# Patient Record
Sex: FEMALE | Race: WHITE | Employment: FULL TIME | URBAN - METROPOLITAN AREA
[De-identification: names, ages, dates, MRNs, and addresses within clinical notes are randomized per-mention and may not be internally consistent; named-entity substitution may affect disease eponyms.]

---

## 2018-10-12 ENCOUNTER — OFFICE VISIT (OUTPATIENT)
Dept: NEUROLOGY | Age: 53
End: 2018-10-12

## 2018-10-12 VITALS
BODY MASS INDEX: 22.18 KG/M2 | OXYGEN SATURATION: 98 % | SYSTOLIC BLOOD PRESSURE: 104 MMHG | HEIGHT: 66 IN | HEART RATE: 64 BPM | WEIGHT: 138 LBS | DIASTOLIC BLOOD PRESSURE: 70 MMHG

## 2018-10-12 DIAGNOSIS — R51.9 PRESSURE IN HEAD: Primary | ICD-10-CM

## 2018-10-12 DIAGNOSIS — R53.83 FATIGUE, UNSPECIFIED TYPE: ICD-10-CM

## 2018-10-12 DIAGNOSIS — R53.1 WEAKNESS GENERALIZED: ICD-10-CM

## 2018-10-12 NOTE — MR AVS SNAPSHOT
[address] [place], [place] [address]  [address]  [address] [place] [address] [address] [phone] Patient: Aminata Phoenix MRN: [id number] :1965 Visit Information Date & Time Provider Department Dept. Phone Encounter #  
 10/12/2018  9:00 AM John Noriega MD [facility] Neurology [facility] [phone] [id number] Follow-up Instructions Return in about 5 weeks (around 2018). Your Appointments 10/24/2018  3:00 PM  
PROCEDURE with EMG SCHEDULE DRUG REHABILITATION  - [facility] ([facility]) Appt Note: complaints of generalized weakness; check 1 arm and 1 leg [facility]  [address]  [address] [place] [address] [address] [phone]  
  
   
 [facility]  [address] [address] [address] Upcoming Health Maintenance Date Due Hepatitis C Screening 1965 DTaP/Tdap/Td series (1 - Tdap) 1985 PAP AKA CERVICAL CYTOLOGY 1985 Shingrix Vaccine Age 50> (1 of 2) 2014 BREAST CANCER SCRN MAMMOGRAM 2014 FOBT Q 1 YEAR AGE 50-75 2014 Influenza Age 5 to Adult 2018 Allergies as of 10/12/2018  Review Complete On: 10/12/2018 By: Angle Loyd LPN No Known Allergies Current Immunizations  Never Reviewed No immunizations on file. Not reviewed this visit You Were Diagnosed With   
  
 Codes Comments Pressure in head    -  Primary ICD-10-CM: X87 ICD-9-CM: 492. 0 Fatigue, unspecified type     ICD-10-CM: R53.83 ICD-9-CM: 780.79 Weakness generalized     ICD-10-CM: R53.1 ICD-9-CM: 780.79 Vitals BP Pulse Height(growth percentile) Weight(growth percentile) SpO2 BMI  
 104/70 64 5' 6\" (1.676 m) 138 lb (62.6 kg) 98% 22.27 kg/m2 Smoking Status Never Smoker BMI and BSA Data Body Mass Index Body Surface Area  
 22.27 kg/m 2 1.71 m 2 Your Updated Medication List  
  
   
 This list is accurate as of 10/12/18  9:46 AM.  Always use your most recent med list.  
  
  
  
  
 CENTRUM SILVER ULTRA WOMEN'S PO Take  by mouth. We Performed the Following ACETYLCHOLINE RECEPTOR PANEL [id number] CPT(R)] CK [id number] CPT(R)] Follow-up Instructions Return in about 5 weeks (around 11/16/2018). To-Do List   
 10/12/2018 Neurology:  EMG LIMITED   
  
 10/12/2018 Imaging:  MRI BRAIN W WO CONT   
  
 10/12/2018 Lab:  VITAMIN D, 25 HYDROXY Introducing [facility]! [facility] introduces [employer] patient portal. Now you can access parts of your medical record, email your doctor's office, and request medication refills online. 1. In your internet browser, go to [email or web address] 2. Click on the First Time User? Click Here link in the Sign In box. You will see the New Member Sign Up page. 3. Enter your [employer] Access Code exactly as it appears below. You will not need to use this code after youve completed the sign-up process. If you do not sign up before the expiration date, you must request a new code. · [employer] Access Code: [email or web address] Expires: 1/10/2019  9:46 AM 
 
4. Enter the last four digits of your Social Security Number (xxxx) and Date of Birth (mm/dd/yyyy) as indicated and click Submit. You will be taken to the next sign-up page. 5. Create a Smallaat ID. This will be your [employer] login ID and cannot be changed, so think of one that is secure and easy to remember. 6. Create a [employer] password. You can change your password at any time. 7. Enter your Password Reset Question and Answer. This can be used at a later time if you forget your password. 8. Enter your e-mail address. You will receive e-mail notification when new information is available in [address] 9. Click Sign Up. You can now view and download portions of your medical record. 10. Click the Download Summary menu link to download a portable copy of your medical information. If you have questions, please visit the Frequently Asked Questions section of the [employer] website. Remember, [employer] is NOT to be used for urgent needs. For medical emergencies, dial 911. Now available from your iPhone and Android! Please provide this summary of care documentation to your next provider. Your primary care clinician is listed as Esa Rodriges. If you have any questions after today's visit, please call [phone].

## 2018-10-12 NOTE — PROGRESS NOTES
Name: Maggy Dodson      :  1965    PCP:   Shruti Rahman MD      Referring:  As above  MRN:   [id number]    Chief Complaint:   Chief Complaint   Patient presents with    New Patient     temporal head pressure     Extremity Weakness     arms and legs, sore, shake sometimes. HISTORY OF PRESENT ILLNESS:     This is a 46 y.o. female with hx of Fibromyalgia and Headaches who presents for evaluation of head pressure and extremity weakness. Describes that for the past 4 years when she's speaking, or if in a crowd and having to talk louder, or when signing in choir (in the past), she develops a moderate pressure in both temples. Not associated with nausea, vomiting. Has baseline light-sensitivity, but says also reports that her ears are more sensitive to sound. She says the head pressure has increased in frequency over the past 1 year, notices it during regular conversations now. Denies any FHx of cerebral aneurysm. Pt was diagnosed with Fibromyalgia in 2000, hasn't taken any prescription fibromyalgia meds. Says she'll only take Advil or Aleve if the pain is really bothering her. Reports over the past 1 year, arms feel weaker, or if she's holding something for long periods of time arms ache. Describes soreness/ aching pain in thighs more than lower legs. No associated rashes on face, chest or extremities. Gets severe cramping in feet mostly in evening when resting. Complete Review of Systems: + anxiety, possible fainting, fatigue, hearing loss, joint pain, memory loss, muscle pain, muscle weakness, tinnitus, skipped beats, snoring, vertigo, visual disturbance; otherwise as noted in HPI     No Known Allergies  Past Medical History:   Diagnosis Date    Fibromyalgia     Headache      Current Outpatient Prescriptions   Medication Sig Dispense Refill    multivit,iron,minerals/lutein (CENTRUM SILVER ULTRA WOMEN'S PO) Take  by mouth.        Past Surgical History:   Procedure Laterality Date    HX PARTIAL HYSTERECTOMY  2013     Family History   Problem Relation Age of Onset    Dementia Mother     Cancer Sister      Social History     Social History    Marital status:      Spouse name: N/A    Number of children: N/A    Years of education: N/A     Occupational History    Not on file. Social History Main Topics    Smoking status: Never Smoker    Smokeless tobacco: Never Used    Alcohol use 1.2 oz/week     2 Glasses of wine per week    Drug use: Not on file    Sexual activity: Not on file     Other Topics Concern    Not on file     Social History Narrative    No narrative on file       PHYSICAL EXAM  Vitals:    10/12/18 0904   BP: 104/70   Pulse: 64   SpO2: 98%   Weight: 62.6 kg (138 lb)   Height: 5' 6\" (1.676 m)       General:  Alert, cooperative, NAD   Head:  Normocephalic, atraumatic. Eyes:  Conjunctivae/corneas clear   Lungs:  Heart:  Non labored breathing  Regular rate, rhythm   Extremities: No edema. Skin: No rashes    Neurologic Exam       Language: normal  Memory:  Alert, oriented to person, place, situation    Cranial Nerves:  I: smell Not tested   II: visual fields Full to confrontation   II: pupils Equal, round, reactive to light   II: optic disc No papilledema   III,VII: ptosis none   III,IV,VI: extraocular muscles  normal   V: facial light touch sensation  normal   VII: facial muscle function  symmetric   VIII: hearing symmetric   IX: soft palate elevation  normal   XI: sternocleidomastoid strength 5/5   XII: tongue  midline      Motor: normal bulk, tone, strength in all exts  Sensory: intact to LT, PP, vibration x 4 exts   Cerebellar: no rest, postural, or intention tremor  Normal FNF and H-Shin bilaterally  Reflexes: 2+ throughout  Plantar response: neutral bilaterally    Gait: normal gait including tandem  Romberg negative     No outside clinic notes/ imaging reports available for review    ASSESSMENT AND PLAN    ICD-10-CM ICD-9-CM    1.  Pressure in head R51 784.0 MRI BRAIN W WO CONT   2. Fatigue, unspecified type R53.83 780.79 VITAMIN D, 25 HYDROXY      MRI BRAIN W WO CONT   3. Weakness generalized R53.1 780.79 CK      EMG LIMITED      MRI BRAIN W WO CONT      ACETYLCHOLINE RECEPTOR PANEL         1) Chronic, episodic, moderate, bi-temporal pressure when speaking for extended periods or having to talk loud. D/w pt does not sound like migraine. May be Tension headache. Will check MRI Brain +/- contrast to rule out underlying structural abnormalities, rule out Multiple scleross 2) Generalized weakness: no weakness or myalgia today on exam, which makes myopathy less likely. Will check CK, Ach-receptor antibodies, and EMG one upper and lower extremity to evaluate for that possibility. 3) Persistent fatigue: has been evaluated by PCP but pt doesn't think Vit D has been checked. Added that to labs. F/u after EMG, labs, MRI Brain completed. Thank you for allowing me to be a part of your patient's care. Please call me at [phone] if you have any questions.      Sincerely,  Inessa Duarte MD

## 2018-10-19 LAB
25(OH)D3+25(OH)D2 SERPL-MCNC: 31.5 NG/ML (ref 30–100)
ACHR BIND AB SER-SCNC: <0.03 NMOL/L (ref 0–0.24)
ACHR BLOCK AB SER-ACNC: 10 % (ref 0–25)
ACHR MOD AB/ACHR TOTAL SFR SER: <12 % (ref 0–20)
CK SERPL-CCNC: 67 U/L (ref 24–173)

## 2018-10-24 ENCOUNTER — OFFICE VISIT (OUTPATIENT)
Dept: NEUROLOGY | Age: 53
End: 2018-10-24

## 2018-10-24 DIAGNOSIS — R53.1 WEAKNESS GENERALIZED: Primary | ICD-10-CM

## 2018-10-24 DIAGNOSIS — M79.10 MUSCLE ACHE: ICD-10-CM

## 2018-11-02 ENCOUNTER — HOSPITAL ENCOUNTER (OUTPATIENT)
Dept: MRI IMAGING | Age: 53
Discharge: HOME | End: 2018-11-02
Attending: PSYCHIATRY & NEUROLOGY
Payer: COMMERCIAL

## 2018-11-02 DIAGNOSIS — R51.9 PRESSURE IN HEAD: ICD-10-CM

## 2018-11-02 DIAGNOSIS — R53.1 WEAKNESS GENERALIZED: ICD-10-CM

## 2018-11-02 DIAGNOSIS — R53.83 FATIGUE, UNSPECIFIED TYPE: ICD-10-CM

## 2018-11-02 PROCEDURE — A9575 INJ GADOTERATE MEGLUMI 0.1ML: HCPCS | Performed by: RADIOLOGY

## 2018-11-02 PROCEDURE — 70553 MRI BRAIN STEM W/O & W/DYE: CPT

## 2018-11-02 PROCEDURE — 74011250636 HC RX REV CODE- 250/636: Performed by: RADIOLOGY

## 2018-11-02 RX ORDER — GADOTERATE MEGLUMINE 376.9 MG/ML
10 INJECTION INTRAVENOUS
Status: COMPLETED | OUTPATIENT
Start: 2018-11-02 | End: 2018-11-02

## 2018-11-02 RX ADMIN — GADOTERATE MEGLUMINE 10 ML: 376.9 INJECTION INTRAVENOUS at 19:56

## 2018-11-14 ENCOUNTER — OFFICE VISIT (OUTPATIENT)
Dept: NEUROLOGY | Age: 53
End: 2018-11-14

## 2018-11-14 VITALS
SYSTOLIC BLOOD PRESSURE: 116 MMHG | WEIGHT: 128 LBS | HEART RATE: 71 BPM | DIASTOLIC BLOOD PRESSURE: 68 MMHG | BODY MASS INDEX: 20.57 KG/M2 | HEIGHT: 66 IN | OXYGEN SATURATION: 99 %

## 2018-11-14 DIAGNOSIS — M79.7 FIBROMYALGIA: ICD-10-CM

## 2018-11-14 DIAGNOSIS — R79.89 LOW VITAMIN D LEVEL: Primary | ICD-10-CM

## 2018-11-14 RX ORDER — DULOXETIN HYDROCHLORIDE 20 MG/1
20 CAPSULE, DELAYED RELEASE ORAL DAILY
Qty: 30 CAP | Refills: 0 | Status: ON HOLD | OUTPATIENT
Start: 2018-11-14 | End: 2020-12-04

## 2018-11-14 RX ORDER — ERGOCALCIFEROL 1.25 MG/1
50000 CAPSULE ORAL
Qty: 8 CAP | Refills: 0 | Status: SHIPPED | OUTPATIENT
Start: 2018-11-14 | End: 2019-01-09

## 2018-11-14 NOTE — PROGRESS NOTES
Interval HPI:   This is a 46 y.o. female who is following up for     Chief Complaint   Patient presents with    Results     Labs: CK normal, Ach-receptor antibodies normal, Vitamin D borderline low (30.1). MRI Brain (+/- contrast): normal.  EMG: Normal right upper and lower extremity electrodiagnostic study. No evidence of right CTS, ulnar neuropathy or cervical radiculopathy. No evidence of right lumbar radiculopathy or peripheral neuropathy. No evidence of myopathic process    Discussed above results, normal MRI Brain, normal EMG (no myopathy or peripheral neuropathy), vitam D level borderline low. Unchanged bi-temporal head pressure when talking loud or speaking for long time. Continues to have fibromyalgia symptoms but hasn't taken any medication for it. Brief ROS: as above or otherwise negative    ============  Brief Hx:  Taken from initial visit on 10-12-18:  1) Chronic, episodic, moderate, bi-temporal pressure when speaking for extended periods or having to talk loud. D/w pt does not sound like migraine. May be Tension headache. Will check MRI Brain +/- contrast to rule out underlying structural abnormalities, rule out Multiple scleross 2) Generalized weakness: no weakness or myalgia today on exam, which makes myopathy less likely. Will check CK, Ach-receptor antibodies, and EMG one upper and lower extremity to evaluate for that possibility. 3) Persistent fatigue: has been evaluated by PCP but pt doesn't think Vit D has been checked. Added that to labs. F/u after EMG, labs, MRI Brain completed. No Known Allergies  Current Outpatient Medications   Medication Sig Dispense Refill    DULoxetine (CYMBALTA) 20 mg capsule Take 1 Cap by mouth daily. 30 Cap 0    multivit,iron,minerals/lutein (CENTRUM SILVER ULTRA WOMEN'S PO) Take  by mouth. Physical Exam  Blood pressure 116/68, pulse 71, height 5' 6\" (1.676 m), weight 58.1 kg (128 lb), SpO2 99 %.     No acute distress  Neck: no stiffness  Skin: no rashes    Focused Neurological Exam     Mental status: Alert and oriented to person, place situation. Language: normal fluency and comprehension; no dysarthria. CNs:   Visual fields grossly normal  Extraocular movements intact, no nystagmus  Face appears symmetric and facial strength normal.    Hearing is intact to casual conversation. Sensory: intact light touch in all 4 extremities  Motor: Normal bulk and strength in all 4 extremities. Reflexes: not examined  Gait: not examined    Impression      ICD-10-CM ICD-9-CM    1. Low vitamin D level R79.89 790.6 ergocalciferol (ERGOCALCIFEROL) 50,000 unit capsule   2. Fibromyalgia M79.7 729.1 DULoxetine (CYMBALTA) 20 mg capsule     Discussed with pt/  that Vit D level is barely within the normal range and taking supplement may help her with her overall symptoms. Also discussed that taking a medication for her Fibromyalgia symptoms may improve her quality of life, though she's been able to push through without any medication for it. We agreed that I would give her the Rx for Cymbalta 20 mg (lowest dose) and she would consider filling it. No neurologic issues at this time so patient will continue care with PCP.

## 2018-11-14 NOTE — PROCEDURES
EMG/ NCS Report   [facility]  Blanka Arenas, [address] [place], [address]   Ph: [phone]   FAX: [phone]/ [phone]  Test Date:  10/24/2018    Patient: Naomi Saavedra : 1965 Physician: Jonathon Chen M.D. Sex: Female Height: ' \" Ref Phys: Jonathon Chen M.D.   ID#: [id number] Weight:  lbs. Technician: Leeann Matos     Patient History / Exam:  CC:? NEUROPATHY VS MYOPATHY    EMG & NCV Findings:  Evaluation of the right Fibular motor nerve showed normal distal onset latency (3.8 ms), normal amplitude (4.7 mV), normal conduction velocity (B Fib-Ankle, 53 m/s), and normal conduction velocity (Poplt-B Fib, 63 m/s). The right median motor nerve showed normal distal onset latency (2.9 ms), normal amplitude (11.4 mV), and normal conduction velocity (Elbow-Wrist, 59 m/s). The right tibial motor nerve showed normal distal onset latency (4.1 ms), normal amplitude (15.1 mV), and normal conduction velocity (Knee-Ankle, 46 m/s). The right ulnar motor nerve showed normal distal onset latency (2.5 ms), normal amplitude (7.2 mV), normal conduction velocity (B Elbow-Wrist, 65 m/s), and normal conduction velocity (A Elbow-B Elbow, 67 m/s). The right median sensory, the right radial sensory, the right sural sensory, and the right ulnar sensory nerves showed normal distal peak latency (R3.0, R1.8, R3.1, R3.2 ms) and normal amplitude (R57.5, R73.9, R15.7, R48.2 µV). The right Sup Fibular sensory nerve showed normal distal peak latency (2.6 ms), normal amplitude (11.3 µV), and normal conduction velocity (Lower leg-Lat ankle, 50 m/s). All F Wave latencies were within normal limits. Needle evaluation of the right extensor digitorum brevis muscle showed increased motor unit amplitude and moderately increased polyphasic potentials. All remaining muscles (as indicated in the following table) showed no evidence of electrical instability. Impression:    Normal right upper and lower extremity electrodiagnostic study  No evidence of right CTS, ulnar neuropathy or cervical radiculopathy  No evidence of right lumbar radiculopathy or peripheral neuropathy  No evidence of myopathic process    ___________________________  Morris Padgett M.D.      Nerve Conduction Studies  Anti Sensory Summary Table     Stim Site NR Peak (ms) Norm Peak (ms) P-T Amp (µV) Norm P-T Amp Site1 Site2 Dist (cm)   Right Median Anti Sensory (2nd Digit)  32.3°C   Wrist    3.0 <4 57.5 >13 Wrist 2nd Digit 14.0   Right Radial Anti Sensory (Base 1st Digit)  31.8°C   Wrist    1.8 <2.8 73.9 >11 Wrist Base 1st Digit 10.0   Right Sup Fibular Anti Sensory (Lat ankle)  32.3°C   Lower leg    2.6 <4.5 11.3 >5 Lower leg Lat ankle 10.0   Site 2    2.6  16.0       Right Sural Anti Sensory (Lat Mall)  32.7°C   Calf    3.1 <4.5 15.7 >4.0 Calf Lat Mall 14.0   Site 2    3.0  16.0       Right Ulnar Anti Sensory (5th Digit)  32.2°C   Wrist    3.2 <4.0 48.2 >9 Wrist 5th Digit 14.0     Motor Summary Table     Stim Site NR Onset (ms) Norm Onset (ms) O-P Amp (mV) Norm O-P Amp Amp (Prev) (%) Site1 Site2 Dist (cm) Melvin (m/s) Norm Melvin (m/s)   Right Fibular Motor (Ext Dig Brev)  32.1°C   Ankle    3.8 <6.5 4.7 >2.6 100.0 Ankle Ext Dig Brev 8.0     B Fib    9.6  4.2  89.4 B Fib Ankle 31.0 53 >38   Poplt    11.2  4.4  104.8 Poplt B Fib 10.0 63 >42   Right Median Motor (Abd Poll Brev)  31.6°C   Wrist    2.9 <4.5 11.4 >4.1 100.0 Wrist Abd Poll Brev 8.0     Elbow    6.3  11.8  103.5 Elbow Wrist 20.0 59 >49   Right Tibial Motor (Abd Chadwick Brev)  31.9°C   Ankle    4.1 <6.1 15.1 >5.3 100.0 Ankle Abd Chadwick Brev 8.0     Knee    12.2  12.9  85.4 Knee Ankle 37.0 46 >39   Right Ulnar Motor (Abd Dig Minimi)  30.6°C   Wrist    2.5 <3.1 7.2 >7.0 100.0 Wrist Abd Dig Minimi 8.0  >50   B Elbow    5.5  7.1  98.6 B Elbow Wrist 19.5 65 >50   A Elbow    7.0  7.1  100.0 A Elbow B Elbow 10.0 67 >50     F Wave Studies     NR F-Lat (ms) Lat Norm (ms) L-R F-Lat (ms) L-R Lat Norm   Right Tibial (Mrkrs) (Abd Hallucis)  31.8°C      49.48 <56  <5.7   Right Ulnar (Mrkrs) (Abd Dig Min)  30.5°C      24.95 <32  <2.5     H Reflex Studies     NR H-Lat (ms) L-R H-Lat (ms) L-R Lat Norm   Right Tibial (Gastroc)  31.7°C      28.00  <2.0     EMG     Side Muscle Nerve Root Ins Act Fibs Psw Recrt Duration Amp Poly Comment   Right Ext Dig Brev Dp Br Peron L5, S1 Nml Nml Nml Nml Nml Incr 2+    Right MedGastroc Tibial S1-2 Nml Nml Nml Nml Nml Nml Nml    Right AntTibialis Dp Br Peron L4-5 Nml Nml Nml Nml Nml Nml Nml    Right VastusMed Femoral L2-4 Nml Nml Nml Nml Nml Nml Nml    Right GluteusMax InfGluteal L5-S2 Nml Nml Nml Nml Nml Nml Nml    Right Lower Lumb Parasp Rami L5,S1 Nml Nml Nml Nml Nml Nml Nml    Right 1stDorInt Ulnar C8-T1 Nml Nml Nml Nml Nml Nml Nml    Right PronatorTeres Median C6-7 Nml Nml Nml Nml Nml Nml Nml    Right Biceps Musculocut C5-6 Nml Nml Nml Nml Nml Nml Nml    Right Triceps Radial C6-7-8 Nml Nml Nml Nml Nml Nml Nml    Right Deltoid Axillary C5-6 Nml Nml Nml Nml Nml Nml Nml    Right Lower Cerv Parasp Rami C7,T1 Nml Nml Nml Nml Nml Nml Nml          Waveforms:

## 2018-11-14 NOTE — PATIENT INSTRUCTIONS
[facility]   Statement to Patients  April 1, 2014      In an effort to ensure the large volume of patient prescription refills is processed in the most efficient and expeditious manner, we are asking our patients to assist us by calling your Pharmacy for all prescription refills, this will include also your  Mail Order Pharmacy. The pharmacy will contact our office electronically to continue the refill process. Please do not wait until the last minute to call your pharmacy. We need at least 48 hours (2days) to fill prescriptions. We also encourage you to call your pharmacy before going to  your prescription to make sure it is ready. With regard to controlled substance prescription refill requests (narcotic refills) that need to be picked up at our office, we ask your cooperation by providing us with at least 72 hours (3days) notice that you will need a refill. We will not refill narcotic prescription refill requests after 4:00pm on any weekday, Monday through Thursday, or after 2:00pm on Fridays, or on the weekends. We encourage everyone to explore another way of getting your prescription refill request processed using [employer], our patient web portal through our electronic medical record system. [employer] is an efficient and effective way to communicate your medication request directly to the office and  downloadable as an claire on your smart phone . [employer] also features a review functionality that allows you to view your medication list as well as leave messages for your physician. Are you ready to get connected? If so please review the attatched instructions or speak to any of our staff to get you set up right away! Thank you so much for your cooperation. Should you have any questions please contact our Practice Administrator.     The Physicians and Staff,  [facility]

## 2018-11-14 NOTE — PROGRESS NOTES
Patient is here with her  for results. States Dr. Qiuntana Shinto was supposed to prescribe vitamin D but never did.

## 2020-11-30 ENCOUNTER — TRANSCRIBE ORDER (OUTPATIENT)
Dept: REGISTRATION | Age: 55
End: 2020-11-30

## 2020-11-30 ENCOUNTER — HOSPITAL ENCOUNTER (OUTPATIENT)
Dept: LAB | Age: 55
Discharge: HOME | End: 2020-11-30
Payer: COMMERCIAL

## 2020-11-30 DIAGNOSIS — Z01.812 PRE-PROCEDURAL LABORATORY EXAMINATIONS: Primary | ICD-10-CM

## 2020-11-30 DIAGNOSIS — Z01.812 PRE-PROCEDURAL LABORATORY EXAMINATIONS: ICD-10-CM

## 2020-11-30 PROCEDURE — 87635 SARS-COV-2 COVID-19 AMP PRB: CPT

## 2020-12-01 LAB — SARS-COV-2, COV2NT: NOT DETECTED

## 2020-12-04 ENCOUNTER — HOSPITAL ENCOUNTER (OUTPATIENT)
Age: 55
Setting detail: OUTPATIENT SURGERY
Discharge: HOME | End: 2020-12-04
Attending: SPECIALIST | Admitting: SPECIALIST
Payer: COMMERCIAL

## 2020-12-04 VITALS
RESPIRATION RATE: 20 BRPM | HEART RATE: 80 BPM | DIASTOLIC BLOOD PRESSURE: 61 MMHG | SYSTOLIC BLOOD PRESSURE: 115 MMHG | HEIGHT: 66 IN | WEIGHT: 146.7 LBS | OXYGEN SATURATION: 100 % | TEMPERATURE: 98.3 F | BODY MASS INDEX: 23.58 KG/M2

## 2020-12-04 DIAGNOSIS — R07.9 CHEST PAIN, UNSPECIFIED TYPE: ICD-10-CM

## 2020-12-04 LAB
ANION GAP SERPL CALC-SCNC: 6 MMOL/L (ref 5–15)
BUN SERPL-MCNC: 14 MG/DL (ref 6–20)
BUN/CREAT SERPL: 16 (ref 12–20)
CALCIUM SERPL-MCNC: 9.5 MG/DL (ref 8.5–10.1)
CHLORIDE SERPL-SCNC: 105 MMOL/L (ref 97–108)
CO2 SERPL-SCNC: 30 MMOL/L (ref 21–32)
CREAT SERPL-MCNC: 0.9 MG/DL (ref 0.55–1.02)
ERYTHROCYTE [DISTWIDTH] IN BLOOD BY AUTOMATED COUNT: 12.7 % (ref 11.5–14.5)
GLUCOSE SERPL-MCNC: 92 MG/DL (ref 65–100)
HCT VFR BLD AUTO: 44.5 % (ref 35–47)
HGB BLD-MCNC: 14.9 G/DL (ref 11.5–16)
MAGNESIUM SERPL-MCNC: 2.1 MG/DL (ref 1.6–2.4)
MCH RBC QN AUTO: 29.7 PG (ref 26–34)
MCHC RBC AUTO-ENTMCNC: 33.5 G/DL (ref 30–36.5)
MCV RBC AUTO: 88.8 FL (ref 80–99)
NRBC # BLD: 0 K/UL (ref 0–0.01)
NRBC BLD-RTO: 0 PER 100 WBC
PLATELET # BLD AUTO: 203 K/UL (ref 150–400)
PMV BLD AUTO: 9.6 FL (ref 8.9–12.9)
POTASSIUM SERPL-SCNC: 3.5 MMOL/L (ref 3.5–5.1)
RBC # BLD AUTO: 5.01 M/UL (ref 3.8–5.2)
SODIUM SERPL-SCNC: 141 MMOL/L (ref 136–145)
WBC # BLD AUTO: 5.4 K/UL (ref 3.6–11)

## 2020-12-04 PROCEDURE — 74011000250 HC RX REV CODE- 250: Performed by: SPECIALIST

## 2020-12-04 PROCEDURE — 77030008543 HC TBNG MON PRSS MRTM -A: Performed by: SPECIALIST

## 2020-12-04 PROCEDURE — C1894 INTRO/SHEATH, NON-LASER: HCPCS | Performed by: SPECIALIST

## 2020-12-04 PROCEDURE — 80048 BASIC METABOLIC PNL TOTAL CA: CPT

## 2020-12-04 PROCEDURE — 36415 COLL VENOUS BLD VENIPUNCTURE: CPT

## 2020-12-04 PROCEDURE — 77030029065 HC DRSG HEMO QCLOT ZMED -B

## 2020-12-04 PROCEDURE — 93458 L HRT ARTERY/VENTRICLE ANGIO: CPT | Performed by: SPECIALIST

## 2020-12-04 PROCEDURE — 83735 ASSAY OF MAGNESIUM: CPT

## 2020-12-04 PROCEDURE — 74011000636 HC RX REV CODE- 636: Performed by: SPECIALIST

## 2020-12-04 PROCEDURE — 74011250636 HC RX REV CODE- 250/636: Performed by: SPECIALIST

## 2020-12-04 PROCEDURE — 77030019569 HC BND COMPR RAD TERU -B: Performed by: SPECIALIST

## 2020-12-04 PROCEDURE — 77030004532 HC CATH ANGI DX IMP BSC -A: Performed by: SPECIALIST

## 2020-12-04 PROCEDURE — 99152 MOD SED SAME PHYS/QHP 5/>YRS: CPT | Performed by: SPECIALIST

## 2020-12-04 PROCEDURE — 85027 COMPLETE CBC AUTOMATED: CPT

## 2020-12-04 PROCEDURE — C1769 GUIDE WIRE: HCPCS | Performed by: SPECIALIST

## 2020-12-04 PROCEDURE — 99153 MOD SED SAME PHYS/QHP EA: CPT | Performed by: SPECIALIST

## 2020-12-04 RX ORDER — HEPARIN SODIUM 200 [USP'U]/100ML
INJECTION, SOLUTION INTRAVENOUS
Status: COMPLETED | OUTPATIENT
Start: 2020-12-04 | End: 2020-12-04

## 2020-12-04 RX ORDER — MIDAZOLAM HYDROCHLORIDE 1 MG/ML
INJECTION, SOLUTION INTRAMUSCULAR; INTRAVENOUS AS NEEDED
Status: DISCONTINUED | OUTPATIENT
Start: 2020-12-04 | End: 2020-12-04 | Stop reason: HOSPADM

## 2020-12-04 RX ORDER — FENTANYL CITRATE 50 UG/ML
INJECTION, SOLUTION INTRAMUSCULAR; INTRAVENOUS AS NEEDED
Status: DISCONTINUED | OUTPATIENT
Start: 2020-12-04 | End: 2020-12-04 | Stop reason: HOSPADM

## 2020-12-04 RX ORDER — MELOXICAM 15 MG/1
15 TABLET ORAL DAILY
COMMUNITY

## 2020-12-04 RX ORDER — SODIUM CHLORIDE 0.9 % (FLUSH) 0.9 %
5-40 SYRINGE (ML) INJECTION AS NEEDED
Status: DISCONTINUED | OUTPATIENT
Start: 2020-12-04 | End: 2020-12-04 | Stop reason: HOSPADM

## 2020-12-04 RX ORDER — DIPHENHYDRAMINE HYDROCHLORIDE 50 MG/ML
25 INJECTION, SOLUTION INTRAMUSCULAR; INTRAVENOUS
Status: DISCONTINUED | OUTPATIENT
Start: 2020-12-04 | End: 2020-12-04 | Stop reason: HOSPADM

## 2020-12-04 RX ORDER — SODIUM CHLORIDE 0.9 % (FLUSH) 0.9 %
5-40 SYRINGE (ML) INJECTION EVERY 8 HOURS
Status: DISCONTINUED | OUTPATIENT
Start: 2020-12-04 | End: 2020-12-04 | Stop reason: HOSPADM

## 2020-12-04 RX ORDER — SODIUM CHLORIDE 9 MG/ML
125 INJECTION, SOLUTION INTRAVENOUS CONTINUOUS
Status: DISCONTINUED | OUTPATIENT
Start: 2020-12-04 | End: 2020-12-04 | Stop reason: HOSPADM

## 2020-12-04 RX ORDER — SODIUM CHLORIDE 9 MG/ML
75 INJECTION, SOLUTION INTRAVENOUS CONTINUOUS
Status: DISCONTINUED | OUTPATIENT
Start: 2020-12-04 | End: 2020-12-04 | Stop reason: HOSPADM

## 2020-12-04 RX ORDER — LIDOCAINE HYDROCHLORIDE 10 MG/ML
INJECTION INFILTRATION; PERINEURAL AS NEEDED
Status: DISCONTINUED | OUTPATIENT
Start: 2020-12-04 | End: 2020-12-04 | Stop reason: HOSPADM

## 2020-12-04 RX ORDER — VERAPAMIL HYDROCHLORIDE 2.5 MG/ML
INJECTION, SOLUTION INTRAVENOUS AS NEEDED
Status: DISCONTINUED | OUTPATIENT
Start: 2020-12-04 | End: 2020-12-04 | Stop reason: HOSPADM

## 2020-12-04 RX ORDER — HYDROCORTISONE SODIUM SUCCINATE 100 MG/2ML
100 INJECTION, POWDER, FOR SOLUTION INTRAMUSCULAR; INTRAVENOUS
Status: DISCONTINUED | OUTPATIENT
Start: 2020-12-04 | End: 2020-12-04 | Stop reason: HOSPADM

## 2020-12-04 RX ORDER — HEPARIN SODIUM 1000 [USP'U]/ML
INJECTION, SOLUTION INTRAVENOUS; SUBCUTANEOUS AS NEEDED
Status: DISCONTINUED | OUTPATIENT
Start: 2020-12-04 | End: 2020-12-04 | Stop reason: HOSPADM

## 2020-12-04 RX ADMIN — SODIUM CHLORIDE 125 ML/HR: 900 INJECTION, SOLUTION INTRAVENOUS at 11:17

## 2020-12-04 RX ADMIN — SODIUM CHLORIDE 75 ML/HR: 900 INJECTION, SOLUTION INTRAVENOUS at 09:17

## 2020-12-04 RX ADMIN — SODIUM CHLORIDE 250 ML: 900 INJECTION, SOLUTION INTRAVENOUS at 13:55

## 2020-12-04 RX ADMIN — SODIUM CHLORIDE 250 ML: 900 INJECTION, SOLUTION INTRAVENOUS at 13:25

## 2020-12-04 NOTE — PROCEDURES
Cath:  Normal cors. Normal LVF (EF 65%). No AVG/MR.   Right radial TR band    F/U with Dr. Good Pole 12/15/20 @ 2pm for palpitation eval.

## 2020-12-04 NOTE — Clinical Note
TRANSFER - OUT REPORT:     Verbal report given to: Sarthak RAZA. Report consisted of patient's Situation, Background, Assessment and   Recommendations(SBAR). Opportunity for questions and clarification was provided. Patient transported with a Registered Nurse and [address] [place] [address] / [facility]. [place] [facility]

## 2020-12-04 NOTE — PROGRESS NOTES
TRANSFER - IN REPORT:    Verbal report received from [facility]) on Hunter Kayser  being received from cath lab(unit) for routine progression of care      Report consisted of patients Situation, Background, Assessment and   Recommendations(SBAR). Information from the following report(s) Procedure Summary was reviewed with the receiving nurse. Opportunity for questions and clarification was provided. Assessment completed upon patients arrival to unit and care assumed. 1100: Patient received from the cath lab. Patient with TR band to the right radial. Clean, dry and intact. Splint in place. Pulses palpable. VS stable. No complaints at this time. 1140: Discharge instructions and prescriptions reviewed with patient  Maverick Doris. Opportunity provided for questions. Maverick Doris verbalized understanding. 1150: Patient with complaint of numbess to right thumb and 2 fingers. Pulses palpable. Hand warm and appropriate color. Notified Dr. Dorothy Lamar. No new orders at this time. Will continue to monitor    1200: 2 ml air released from TR Band. Bleeding noted. Radial and Ulnar pulse on right wrist palpable. Pt oozed. 2mL of air reinfused. Will continue to monitor. 1252: 2 ml air released from TR Band. No bleeding or hematoma noted. Radial and Ulnar pulse on right wrist palpable. Pt tolerated well. Will continue to monitor. 1258: 2 ml air released from TR Band. No bleeding or hematoma noted. Radial and Ulnar pulse on right wrist palpable. Pt tolerated well. Will continue to monitor. 1303: 2 ml air released from TR Band. No bleeding or hematoma noted. Radial and Ulnar pulse on right wrist palpable. Pt tolerated well. Will continue to monitor. 1309: 2 ml air released from TR Band. No bleeding or hematoma noted. Radial and Ulnar pulse on right wrist palpable. Pt tolerated well. Will continue to monitor. 1315: 2 ml air released from TR Band. No bleeding or hematoma noted.  Radial and Ulnar pulse on right wrist palpable. Pt tolerated well. Will continue to monitor. 1320: Air release completed. TR Band removed from right wrist. No bleeding or  Hematoma. Dressing applied. Wrist immobilizer in place. Radial and ulnar pulse remain palpable on affected extremity. Pt tolerated well. Instructions given to pt regarding movement and activity restrictions. Pt voiced understanding. Patient reports no numbness to fingers now that band is discontinued. 1322: Patient blood pressure 79/43. Patient placed in reverse [place] Feeling lightheaded and nausaous. Notified Dr. Elvie Rodriguez. Orders to give 250mL bolus now and another as needed. Will continue to monitor. 1350: Pressures improved with bolus. Patient asymtomatic. Last pressure 88/71. Patient given 2nd bolus per Dr. Elvie Rodriguez. Patient eating lunch at this time. 1430: Patient blood pressures improved. Will continue to monitor. No complaints of dizziness or lightheadness. Discharge instructions and prescriptions reviewed with patient. Opportunity provided for questions. Patient verbalized understanding. Signed copy of discharge placed in the front of patient's chart. 1505: Patient dangled on the side of the bed. Patient with quickclot to right radial. Clean, dry and intact. No hematoma. Pulses palpable. Patient reports improvement to numbess. Sensation present. VS stable. No complaints at this time. 1510: Patient ambulated to the bathroom. Voided. Patient with quickclot to right radial. Clean, dry and intact. No hematoma. Pulses palpable. Sensation present. VS stable. No complaints at this time. 1520: IV and tele removed. Patient assisted with dressing    [phone]: Patient escorted via wheelchair to entrance.  Lorenzo Taylor driving. Patient discharged into care of Lorenzo Taylor.

## 2020-12-04 NOTE — Clinical Note
TRANSFER - IN REPORT:     Verbal report received from: Vazquez Jeong. Report consisted of patient's Situation, Background, Assessment and   Recommendations(SBAR). Opportunity for questions and clarification was provided. Assessment completed upon patient's arrival to unit and care assumed. Patient transported with a Registered Nurse.

## 2020-12-04 NOTE — H&P
Date of Surgery Update:  Deonte Shaw was seen and examined. History and physical has been reviewed. The patient has been examined. There have been no significant clinical changes since the completion of the originally dated History and Physical.    Signed By: Josefina Neumann MD     December 4, 2020 9:30 AM         +ETT        Pratibha Hooper 1965   Office/Outpatient Visit  Visit Date: Wed, Nov 25, 2020 11:30 am  Provider: Hamlet Pradhan MD (Assistant: Nash Cortez LPN )  Location: [facility])[address] [place]. [address] [phone]    Electronically signed by Rand Dancer, MD on  11/25/2020 12:25:24 PM                           Subjective:    CC: Ms. Jodi Melendrez is a 63year old White female. Her primary care physician is Shyla Beltran. She is here to follow up with the doctor regarding previous testing, treadmill stress test - 11/19/20. She presents today with a complaint of chest pain and palpitations. Patient verbalized medications unchanged since last office visit. The primary reason for today's visit is evaluation of the following: abnormal graded exercise treadmill stress test on 11/19/20. She has a history of unspecified hyperlipidemia and hypertension. **No EKG today, sxs have gotten better. **    HPI:           Hypercholesterolemia: Current treatment includes diet. Regarding hypertension:  Type Primary Hypertension           Regarding chest pain:  MD Notes: she needs a cath, she is thinking about it She characterizes the pain as pressure. Cardiac risk factors include a family history of coronary artery disease. Started when had partial hysterectomy. She has been feeling pressure. Asking if with activity - not with playing golf or exercising, she got the pain last night while sitting on the couch while watching TV. When she walks up hill playing golf, she will feel pain if she does prolonged walking. No chest pain on treadmill.  EKG did not tell enough information. Her mother had stent placement in her [de-identified]. She has siblings, with diabetes. Her son is type 1 diabetic. In last few weeks, the palpitations and pain was more frequent. She has trouble telling if it is acid reflux. She had colonoscopy, upper and lower. She wants to know if menopause is causing her symptoms - not necessarily. She walks and rides on cart around golf cart. Has to slow down and stop, sometimes she feels pinch in her chest. Does not stay long. Makes her aware she needs to stop. Telling her the EKG with stress test showed possibility of blockage. She went walking a few weeks ago, was feeling the same symptoms. She was raking mulch the other day, she noticed her whole body was trying to tell her to stop. Swelling in hands, telling her this could be menopausal.          Paplitations/Arrhythmias:  MD Notes: chest pain atypical but sometimes while golfing, palpitations flare not so much in the last week, need blood work ett  , Regarding other cardiac arrhythmias: pt presents with palpitations No palpitations in the last few weeks. (Mild)   Abnormal treadmill stress test (TMST) noted. ROS:   Discussed relevant lab and imaging studies along with the plan of treatment with the nurse. EYES:  Negative for blurred vision and eye pain. E/N/T:  Negative for epistaxis and hoarseness. CARDIOVASCULAR:  Please see HPI. RESPIRATORY:  Negative for cough and hemoptysis. GASTROINTESTINAL:  Negative for abdominal pain and dysphagia. MUSCULOSKELETAL:  Negative for arthralgias and back pain. NEUROLOGICAL:  Negative for headaches, paresthesias, and weakness. HEMATOLOGIC/LYMPHATIC:  Negative for easy bruising, bleeding, and lymphadenopathy. PSYCHIATRIC:  No symptoms reported.       Past Medical History / Family History / Social History:     Last Reviewed on 11/25/2020 11:51 AM by Rosana Chew LPN  Past Medical History:     Hyperlipidemia  Hypertension   Osteoarthritis INFLUENZA VACCINE: was last done      Past Cardiac Procedures:  Treadmill Stress Test:  2020 - 9 mins. There was ECG evidence of ischemia. The patient did not experience chest discomfort. Normal heart rate response to exercise. Cormier score is -1 with the heart rate recovery of 37. Surgical History:   Surgical/Procedural History:   Hysterectomy: partial  shoulder surg     Family History:   Father: Medical history unknown;    Mother: ;  coronary artery disease     Social History:   Social History:   Marital Status:      Tobacco/Alcohol/Supplements:   Last Reviewed on 2020 11:51 AM by Rosana De La Cruz LPN  TOBACCO/ALCOHOL/SUPPLEMENTS   Tobacco: Non- smoker   Alcohol: Drinks alcohol very infrequently. Caffeine:  She admits to consuming caffeine via coffee ( 2 servings per day ). Substance Abuse History:   Last Reviewed on 2020 11:51 AM by Parvez Bates LPN, [facility] History:   Last Reviewed on 2020 11:51 AM by Parvez Bates LPN, [address] (eg STDs): Last Reviewed on 2020 11:51 AM by Rosana De La Cruz LPN    Allergies:   Last Reviewed on 2020 11:51 AM by Rosana De La Cruz LPN  No Known Allergies. Current Medications:   Last Reviewed on 2020 11:51 AM by Rosana De La Cruz LPN  meloxicam 15 mg oral tablet [take 1 tablet (15 mg) by oral route once daily]    Objective:    Vitals:     Historical:   2020  BP:   140/77 mm Hg ( (left arm, , sitting, );) 2020  Wt:   147lbs  Current: 2020 11:51:43 AM  Ht:  5 ft, 6 in; Wt: 148 lbs;  BMI: 23. 9BP: 119/70 mm Hg (left arm, sitting);  P: 60 bpm (left arm (BP Cuff), sitting)    Exams:   GENERAL:  Alert, oriented to person, place and time x3. EYES:  Normal lids without xanthelasma; conjunctiva unremarkable; no scleral icterus. HEENT:  Face symmetric, voice clear, extraocular muscles intact. NECK:  Supple. No bruits, No JVD. LUNGS:  Clear to auscultation.  No rales, no wheezing. CARDIAC:  Regular rate and rhythm. PMI not displaced. ABDOMEN:  Soft. Positive bowel sounds. Non-tender. MUSCULOSKELETAL:  Normal range of motion, strength and tone. EXTREMITIES: hand edema bilaterally   SKIN: No significant rashes or lesions; no suspicious moles. NEUROLOGICAL:  No focal deficits, cranial nerves II-XII are grossly intact. PSYCHIATRIC:  Appropriate affect and demeanor; normal speech pattern; grossly normal memory. Assessment:     E78.4   Other hyperlipidemia     I10   Essential (primary) hypertension     R07.9   Chest pain, unspecified     R00.2   Palpitations     794.39   Abnormal treadmill stress test (TMST)   (Mild)     ORDERS:     Procedures Ordered:     [id number]  Education and train for pt self-mgmt by qualified, nonphysician, ea 30 minutes; individual pt  (Send-Out)          XCATH  Cardiac Cath  (In-House)          Other Orders:     [id number]  Queried Patient for Tobacco Use  (Send-Out)              Plan:     Chest pain, unspecified      Medication list has been reviewed. Continue current medications. Smoking Status:  Nonsmoker     Testing/Procedures:   Cardiac Catheterization -   Explained to the patient the indication, procedure, risks, and benefits of cardiac catheterization. The patient understands and wishes to proceed with the cath to be performed Schedule a follow-up appointment in 1 week. Call this afternoon with update on cath. Take it easy, no strenuous activity   Discussed with patient diet, exercise plan and lifestyle modifications. The above note was transcribed by Chriss Ramirez RN and authenticated by Dr. Claire Anderson prior to sign off.

## 2020-12-04 NOTE — PROGRESS NOTES
TRANSFER - OUT REPORT:    Verbal report given to Kalie RAZA(name) on Zoraida Inman  being transferred to cath lab(unit) for ordered procedure       Report consisted of patients Situation, Background, Assessment and   Recommendations(SBAR). Information from the following report(s) SBAR was reviewed with the receiving nurse. Lines:   Peripheral IV 12/04/20 Right Antecubital (Active)   Site Assessment Clean, dry, & intact 12/04/20 0914   Phlebitis Assessment 0 12/04/20 0914   Infiltration Assessment 0 12/04/20 0914   Dressing Status Clean, dry, & intact 12/04/20 0914   Dressing Type Transparent 12/04/20 0914   Hub Color/Line Status Pink;Flushed; Infusing 12/04/20 0914        Opportunity for questions and clarification was provided.       Patient transported with:   Registered Nurse

## 2023-03-25 NOTE — DISCHARGE INSTRUCTIONS
Radial Cardiac Catheterization/Angiography Discharge Instructions    It is normal to feel tired the first couple days. Take it easy and follow the physicians instructions. CHECK THE CATHETER INSERTION SITE DAILY:    Remove the wrist dressing 24 hours after the procedure. You may shower 24 hours after the procedure. Wash with soap and water and pat dry. Gentle cleaning of the site with soap and water is sufficient, cover with a dry clean dressing or bandage. Do not apply creams or powders to the area. No soaking the wrist for 3 days. Leave the puncture site open to air after 24 hours post-procedure. CALL THE PHYSICIANS:     If the site becomes red, swollen or feels warm to the touch  If there is bleeding or drainage or if there is unusual pain at the radial site. If there is any minor oozing, you may apply a band-aid and remove after 12 hours. If the bleeding continues, hold pressure with the middle finger against the puncture site and the thumb against the back of the wrist,call 911 to be transported to the hospital.  DO NOT DRIVE YOURSELF, [facility] ACTIVITY:   For the first 24 hours do not manipulate the wrist.  No lifting, pushing or pulling over 3-5 pounds with the affected wrist for 7 daysand no straining the insertion site. Do not lift grocery bags or the garbage can, do not run the vacuum  or  for 7 days. Start with short walks as in the hospital and gradually increase as tolerated each day. It is recommended to walk 30 minutes 5-7 days per week. Follow your physicians instructions on activity. Avoid walking outside in extremes of heat or cold. Walk inside when it is cold and windy or hot and humid. Things to keep in mind:  No driving for at least 24 hours, or as designated by your physician. Limit the number of times you go up and down the stairs  Take rests and pace yourself with activity.   Be careful and do not strain with Wireless monitors applied to patient. Patient encouraged to ambulate in the hallway. Patient agreeable to recommendation. bowel movements. MEDICATIONS:    Take all medications as prescribed  Call your physician if you have any questions  Keep an updated list of your medications with you at all times and give a list to your physician and pharmacist    SIGNS AND SYMPTOMS:   Be cautious of symptoms of angina or recurrent symptoms such as chest discomfort, unusual shortness of breath or fatigue. These could be symptoms of restenosis, a new blockage or a heart attack. If your symptoms are relieved with rest it is still recommended that you notify your physician of recurrent chest pain or discomfort. For CHEST PAIN or symptoms of angina not relieved with rest:  If the discomfort is not relieved with rest, and you have been prescribed Nitroglycerin, take as directed (taken under the tongue, one at a time 5 minutes apart for a total of 3 doses). If the discomfort is not relieved after the 3rd nitroglycerin, call 911. If you have not been prescribed Nitroglycerin  and your chest discomfort is not relieved with rest, call 911. AFTER CARE:   Follow up with your physician as instructed. Follow up with Kirill Jorge MD on December 15th, 2020 at 2pm.  [address] [facility] [address]  [phone]    Follow a heart healthy diet with proper portion control, daily stress management, daily exercise, blood pressure and cholesterol control , and smoking cessation.

## 2023-05-21 RX ORDER — MELOXICAM 15 MG/1
15 TABLET ORAL DAILY
COMMUNITY

## (undated) DEVICE — PROCEDURE KIT FLUID MGMT CUST MAINFOLD STRL

## (undated) DEVICE — TUBING PRSS MON L12IN PVC RIG NONEXPANDING M TO FEM CONN

## (undated) DEVICE — PACK PROCEDURE SURG HRT CATH

## (undated) DEVICE — SYRINGE MED 10ML RED POLYCARB BRL FIX M LUER CONN FLAT GRP

## (undated) DEVICE — CATH DIAG D 5F 155 MULTIPK X3 -- IMPULSE 16391-302

## (undated) DEVICE — SUPPORT WRST AD W3.5XL9IN DIA14.5IN ART SFT ADJ HK AND LOOP

## (undated) DEVICE — ROSEN CURVED WIRE GUIDE: Brand: ROSEN

## (undated) DEVICE — GLIDESHEATH SLENDER ACCESS KIT: Brand: GLIDESHEATH SLENDER

## (undated) DEVICE — TR BAND RADIAL ARTERY COMPRESSION DEVICE: Brand: TR BAND

## (undated) DEVICE — SYR MED COLOR CODED 3ML RED -- MEDALLION